# Patient Record
Sex: FEMALE | Race: WHITE
[De-identification: names, ages, dates, MRNs, and addresses within clinical notes are randomized per-mention and may not be internally consistent; named-entity substitution may affect disease eponyms.]

---

## 2018-09-27 ENCOUNTER — HOSPITAL ENCOUNTER (OUTPATIENT)
Dept: HOSPITAL 88 - MRI | Age: 38
End: 2018-09-27
Attending: FAMILY MEDICINE
Payer: COMMERCIAL

## 2018-09-27 DIAGNOSIS — G43.909: Primary | ICD-10-CM

## 2018-09-27 DIAGNOSIS — G44.209: ICD-10-CM

## 2018-09-27 PROCEDURE — 70551 MRI BRAIN STEM W/O DYE: CPT

## 2018-09-27 NOTE — DIAGNOSTIC IMAGING REPORT
EXAMINATION: MRI of the brain without contrast.





HISTORY: Persistent severe headache for the last 24 hours

COMPARISON: None.

TECHNIQUE: Sagittal T2; axial DWI, T2, FLAIR, T1-IR, T2 gradient echo; coronal

FLAIR. 

IMAGE QUALITY:  Adequate.





FINDINGS:

     Parenchyma: 

1.  A few scattered supratentorial white matter T2 and FLAIR hyperintense foci

(less than 5) which may be migraine related or secondary to minimal chronic

microvascular ischemic.

2.  No mass, hemorrhage, acute or chronic infarcts.



     Skull: Unremarkable.       

     Vessels: Expected flow voids present in the major arteries and dural

sinuses.

     Extra-axial spaces: No abnormal signal intensity or mass effect.

     Brain volume: Within normal limits for age.

     Ventricles: No hydrocephalus or displacement.

     Foramen magnum: Unremarkable.  

     Sella: Unremarkable.  

     Paranasal / mastoid sinuses: No significant inflammatory disease.



IMPRESSION:



No acute intracranial abnormalities particularly no evidence of hemorrhage.



The findings were discussed with the attending physician Dr. Corea at the time

of this dictation.



Signed by: Dr. Anneliese Smith M.D. on 9/27/2018 2:51 PM

## 2020-02-07 ENCOUNTER — HOSPITAL ENCOUNTER (OUTPATIENT)
Dept: HOSPITAL 88 - US | Age: 40
End: 2020-02-07
Attending: FAMILY MEDICINE
Payer: COMMERCIAL

## 2020-02-07 DIAGNOSIS — E04.1: Primary | ICD-10-CM

## 2020-02-07 PROCEDURE — 76536 US EXAM OF HEAD AND NECK: CPT

## 2020-02-07 NOTE — DIAGNOSTIC IMAGING REPORT
Thyroid ultrasound.





History: Thyroid nodule.



Comparison: None available.



Discussion: Transverse and longitudinal images of the thyroid were obtained

demonstrating normal echogenicity of the thyroid. The right lobe is mildly

enlarged measuring 4.5 x 1.8 x 2.3 cm and the left is normal in size measuring

4.3 x 1.2 x 1.8 cm.  The isthmus is within normal limits.  



On the right, 2 cystic lesions are present, measuring 1.7 x 1.1 x 1.5 cm and

2.1 x 1.0 x 1.9 cm. A single solid oval hyperechoic nodule is present in the

interpolar region measuring 1.2 x 0.7 x 0.7 cm. No nodules are present on the

left. 





IMPRESSION: 



2 benign right thyroid cysts and a single 1.2 cm TR3 right thyroid nodule. No

follow-up recommended since the nodule is less than 1.5 cm.



Signed by: Shailesh Bradley on 2/7/2020 1:44 PM